# Patient Record
Sex: MALE | URBAN - METROPOLITAN AREA
[De-identification: names, ages, dates, MRNs, and addresses within clinical notes are randomized per-mention and may not be internally consistent; named-entity substitution may affect disease eponyms.]

---
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
## 2025-03-25 ENCOUNTER — OFFICE VISIT (OUTPATIENT)
Dept: DERMATOLOGY | Facility: IMAGING CENTER | Age: 20
End: 2025-03-25

## 2025-03-25 DIAGNOSIS — L70.0 ACNE VULGARIS: ICD-10-CM

## 2025-03-25 DIAGNOSIS — L70.0 CYSTIC ACNE: ICD-10-CM

## 2025-03-25 PROCEDURE — 99203 OFFICE O/P NEW LOW 30 MIN: CPT | Performed by: STUDENT IN AN ORGANIZED HEALTH CARE EDUCATION/TRAINING PROGRAM

## 2025-03-25 RX ORDER — CLINDAMYCIN PHOSPHATE 11.9 MG/ML
SOLUTION TOPICAL
Qty: 60 ML | Refills: 1 | Status: SHIPPED | OUTPATIENT
Start: 2025-03-25

## 2025-03-25 NOTE — PROGRESS NOTES
[facility] NOTE    Chief Complaint   Patient presents with    [facility]        HPI:    Juan Antonio Preston is a 19 y.o. male here for evaluation of bump over back of head    HPI/location: Back of head  Time present: 02/2025  Painful lesion: Yes  Itching lesion: No  Enlarging lesion:  more redness.     Was larger a few weeks ago, and now smaller. Possibly draining.    Gets acne on the scalp.    Review of Systems: No fevers, chill. Pertinent positives and negatives above.       Medications, Medical History, Surgical History, Family History & Allergies:  Reviewed in the chart, relevant history noted above.       PHYSICAL EXAM  Focused skin exam of scalp and face    - L parietal scalp with erythematous nodule  - scattered erythematous papule on scalp      ASSESSMENT & PLAN    # Acne cyst  - start clindamycin solution topical daily to affected area of scalp  - intralesional kenalog to acne cyst on L parietal scalp as below (NO CHARGE for TAC and injection)  Intralesional steroid injection procedure:  Risk of procedure discussed, including skin atrophy, discoloration, and minimal risk of infection. Verbal consent was obtained.   Area cleansed with alcohol wipe. A steroid injection was performed using 0.3ml of triamcinolone 10 mg/ml at L parietal scalp and R parietal scalp. This was well tolerated.           Return to clinic as needed         Glenna Groves MD  [facility]